# Patient Record
Sex: MALE | Race: WHITE | NOT HISPANIC OR LATINO | Employment: UNEMPLOYED | URBAN - NONMETROPOLITAN AREA
[De-identification: names, ages, dates, MRNs, and addresses within clinical notes are randomized per-mention and may not be internally consistent; named-entity substitution may affect disease eponyms.]

---

## 2018-07-26 ENCOUNTER — HOSPITAL ENCOUNTER (OUTPATIENT)
Dept: GENERAL RADIOLOGY | Facility: OTHER | Age: 28
Discharge: HOME | End: 2018-07-26
Attending: FAMILY MEDICINE | Admitting: FAMILY MEDICINE
Payer: MEDICAID

## 2018-07-26 ENCOUNTER — OFFICE VISIT (OUTPATIENT)
Dept: FAMILY MEDICINE | Facility: OTHER | Age: 28
End: 2018-07-26
Attending: FAMILY MEDICINE
Payer: MEDICAID

## 2018-07-26 VITALS
TEMPERATURE: 98.2 F | WEIGHT: 315 LBS | BODY MASS INDEX: 46.65 KG/M2 | DIASTOLIC BLOOD PRESSURE: 80 MMHG | SYSTOLIC BLOOD PRESSURE: 132 MMHG | HEART RATE: 64 BPM | HEIGHT: 69 IN

## 2018-07-26 DIAGNOSIS — M22.2X2 PATELLOFEMORAL PAIN SYNDROME OF BOTH KNEES: ICD-10-CM

## 2018-07-26 DIAGNOSIS — Z23 NEED FOR DIPHTHERIA-TETANUS-PERTUSSIS (TDAP) VACCINE, ADULT/ADOLESCENT: ICD-10-CM

## 2018-07-26 DIAGNOSIS — Z13.220 SCREENING FOR HYPERLIPIDEMIA: ICD-10-CM

## 2018-07-26 DIAGNOSIS — Z13.228 SCREENING FOR METABOLIC DISORDER: ICD-10-CM

## 2018-07-26 DIAGNOSIS — M25.561 CHRONIC PAIN OF BOTH KNEES: ICD-10-CM

## 2018-07-26 DIAGNOSIS — G89.29 CHRONIC PAIN OF BOTH KNEES: ICD-10-CM

## 2018-07-26 DIAGNOSIS — Z72.0 TOBACCO ABUSE: ICD-10-CM

## 2018-07-26 DIAGNOSIS — M22.2X1 PATELLOFEMORAL PAIN SYNDROME OF BOTH KNEES: ICD-10-CM

## 2018-07-26 DIAGNOSIS — Z13.0 SCREENING FOR DEFICIENCY ANEMIA: ICD-10-CM

## 2018-07-26 DIAGNOSIS — Z00.00 VISIT FOR PREVENTIVE HEALTH EXAMINATION: Primary | ICD-10-CM

## 2018-07-26 DIAGNOSIS — M25.562 CHRONIC PAIN OF BOTH KNEES: ICD-10-CM

## 2018-07-26 DIAGNOSIS — G44.209 TENSION HEADACHE: ICD-10-CM

## 2018-07-26 PROCEDURE — G0463 HOSPITAL OUTPT CLINIC VISIT: HCPCS | Mod: 25

## 2018-07-26 PROCEDURE — 73564 X-RAY EXAM KNEE 4 OR MORE: CPT

## 2018-07-26 PROCEDURE — 90715 TDAP VACCINE 7 YRS/> IM: CPT | Performed by: FAMILY MEDICINE

## 2018-07-26 PROCEDURE — 99395 PREV VISIT EST AGE 18-39: CPT | Performed by: FAMILY MEDICINE

## 2018-07-26 PROCEDURE — 99214 OFFICE O/P EST MOD 30 MIN: CPT | Mod: 25 | Performed by: FAMILY MEDICINE

## 2018-07-26 PROCEDURE — G0463 HOSPITAL OUTPT CLINIC VISIT: HCPCS

## 2018-07-26 PROCEDURE — 90471 IMMUNIZATION ADMIN: CPT

## 2018-07-26 RX ORDER — BUTALBITAL, ACETAMINOPHEN AND CAFFEINE 50; 325; 40 MG/1; MG/1; MG/1
1-2 TABLET ORAL EVERY 4 HOURS PRN
Qty: 30 TABLET | Refills: 1 | Status: SHIPPED | OUTPATIENT
Start: 2018-07-26 | End: 2019-05-13

## 2018-07-26 ASSESSMENT — PAIN SCALES - GENERAL: PAINLEVEL: NO PAIN (0)

## 2018-07-26 NOTE — MR AVS SNAPSHOT
After Visit Summary   7/26/2018    Suraj Currie    MRN: [id number]           Patient Information     Date Of Birth          1990        Visit Information        Provider Department      7/26/2018 3:15 PM Michael Marquis MD [facility]        Today's Diagnoses     Visit for preventive health examination    -  1    Screening for deficiency anemia        Screening for metabolic disorder        Screening for hyperlipidemia        Tobacco abuse        Chronic pain of both knees        Patellofemoral pain syndrome of both knees        Tension headache        Need for diphtheria-tetanus-pertussis (Tdap) vaccine, adult/adolescent          Care Instructions    PT will call you.  Good shoes with a firm last (sole), arch support and good heel counter support.  Hamstring stretches, bike riding.     For headache, keep track of when they happen, possible triggers.              Follow-ups after your visit        Follow-up notes from your care team     Return in about 4 weeks (around 8/23/2018).      Future tests that were ordered for you today     Open Future Orders        Priority Expected Expires Ordered    XR Knee Standing 2v Bilateral & 2v Bilateral Routine 7/26/2018 7/26/2019 7/26/2018    CBC with platelets differential Routine  7/27/2019 7/26/2018    Comprehensive metabolic panel Routine  7/27/2019 7/26/2018    Lipid Profile Routine  7/27/2019 7/26/2018            Who to contact     If you have questions or need follow up information about today's clinic visit or your schedule please contact [facility] directly at [phone].  Normal or non-critical lab and imaging results will be communicated to you by MyChart, letter or phone within 4 business days after the clinic has received the results. If you do not hear from us within 7 days, please contact the clinic through MyChart or phone. If you have a critical or abnormal lab result, we will notify you by  "phone as soon as possible.  Submit refill requests through [employer] or call your pharmacy and they will forward the refill request to us. Please allow 3 business days for your refill to be completed.          Additional Information About Your Visit        Care EveryWhere ID     This is your Care EveryWhere ID. This could be used by other organizations to access your [place] medical records  [id number]        Your Vitals Were     Pulse Temperature Height BMI (Body Mass Index)          64 98.2  F (36.8  C) (Tympanic) 5' 9\" (1.753 m) 47.02 kg/m2         Blood Pressure from Last 3 Encounters:   07/26/18 132/80   08/12/14 114/74    Weight from Last 3 Encounters:   07/26/18 318 lb 6.4 oz (144.4 kg)   08/12/14 299 lb (135.6 kg)              We Performed the Following     TDAP VACCINE (BOOSTRIX)          Today's Medication Changes          These changes are accurate as of 7/26/18  4:30 PM.  If you have any questions, ask your nurse or doctor.               Start taking these medicines.        Dose/Directions    butalbital-acetaminophen-caffeine -40 MG per tablet   Commonly known as:  FIORICET/ESGIC   Used for:  Tension headache   Started by:  Michael Marquis MD        Dose:  1-2 tablet   Take 1-2 tablets by mouth every 4 hours as needed   Quantity:  30 tablet   Refills:  1            Where to get your medicines      Some of these will need a paper prescription and others can be bought over the counter.  Ask your nurse if you have questions.     Bring a paper prescription for each of these medications     butalbital-acetaminophen-caffeine -40 MG per tablet                Primary Care Provider Fax #    Physician No Ref-Primary [phone]       No address on file        Equal Access to Services     [facility] : Rita Farias, ligia salmon, tariq bowser. So [facility] [phone].    ATENCIÓN: Si habla español, tiene a davis disposición " servicios gratuitos de asistencia lingüística. [place] thakur [phone].    We comply with applicable federal civil rights laws and [place] laws. We do not discriminate on the basis of race, color, national origin, age, disability, sex, sexual orientation, or gender identity.            Thank you!     Thank you for choosing [facility]  for your care. Our goal is always to provide you with excellent care. Hearing back from our patients is one way we can continue to improve our services. Please take a few minutes to complete the written survey that you may receive in the mail after your visit with us. Thank you!             Your Updated Medication List - Protect others around you: Learn how to safely use, store and throw away your medicines at www.disposemymeds.org.          This list is accurate as of 7/26/18  4:30 PM.  Always use your most recent med list.                   Brand Name Dispense Instructions for use Diagnosis    butalbital-acetaminophen-caffeine -40 MG per tablet    FIORICET/ESGIC    30 tablet    Take 1-2 tablets by mouth every 4 hours as needed    Tension headache

## 2018-07-26 NOTE — PATIENT INSTRUCTIONS
PT will call you.  Good shoes with a firm last (sole), arch support and good heel counter support.  Hamstring stretches, bike riding.     For headache, keep track of when they happen, possible triggers.

## 2018-07-26 NOTE — NURSING NOTE
Patient presents in the clinic to establish care, and to discuss possible elevated blood pressure. Patient also has concerns of pain in bilateral knees, denies any injury.  Eusebia Smith LPN 7/26/2018 3:14 PM

## 2018-07-26 NOTE — PROGRESS NOTES
Nursing Notes:   Janine Smith LPN  2018  3:50 PM  Signed  Patient presents in the clinic to establish care, and to discuss possible elevated blood pressure. Patient also has concerns of pain in bilateral knees, denies any injury.  Eusebia Luis [place] 2018 3:14 PM      SUBJECTIVE:  Suraj Currie  is a 27 year old male who comes in today to establish care.  He had some concerns about high blood pressure. He has not checked it. His mom has high blood pressure. He has been getting headaches.  He will have them every other day. Ibuprofen didn't seem to help. Sleep will help some. No aura.  No recent head injury. He thinks there may be some headache history in the family.     He has had pain in both knees with no known injury.  They have bothered him for some time. He was a wrestler in high school. His joints are noisy. He has a hard time crouching down. They will swell and will feel tight.  Right knee bothers at night, left bothers during the day. He has tried icing and it didn't help. No knee surgeries.     PHQ-9 SCORE 2018   Total Score 2     Immunization status is uncertain as there is nothing in [facility].  His last tetanus shot over 10 years ago.     He had a sister who  in her early 20's possibly of an overdose. He has another sister he has never met.     Past Medical, Family, and Social History reviewed and updated as noted below.   ROS is negative except as noted above       No Known Allergies,   Family History   Problem Relation Age of Onset     Hypertension Mother      Hypertension Sister      Diabetes Sister      Lung Cancer Maternal Grandmother      Hypertension Maternal Grandfather      Heart Failure Maternal Grandfather      Diabetes Maternal Grandfather      Other - See Comments Sister      possible OD.   ,   Current Outpatient Prescriptions   Medication     butalbital-acetaminophen-caffeine (FIORICET/ESGIC) -40 MG per tablet     No current facility-administered medications for  "this visit.    , History reviewed. No pertinent past medical history.,   Patient Active Problem List    Diagnosis Date Noted     Tobacco abuse 07/26/2018     Priority: Medium   ,   Past Surgical History:   Procedure Laterality Date     TONSILLECTOMY & ADENOIDECTOMY  age 11    and   Social History   Substance Use Topics     Smoking status: Current Every Day Smoker     Packs/day: 0.50     Types: Cigarettes     Smokeless tobacco: Never Used     Alcohol use No      Comment: Alcoholic Drinks/day: occasional     OBJECTIVE:  /80 (BP Location: Right arm, Patient Position: Sitting, Cuff Size: Adult Large)  Pulse 64  Temp 98.2  F (36.8  C) (Tympanic)  Ht 5' 9\" (1.753 m)  Wt 318 lb 6.4 oz (144.4 kg)  BMI 47.02 kg/m2   EXAM:  General Appearance: Pleasant, alert, obese white male appropriate appearance for age. No acute distress  Head Exam: Normal. Normocephalic, atraumatic.  Eye Exam:  Normal external eye, conjunctiva, lids, cornea. ERICA. EOMI  Ear Exam: Normal TM's bilaterally. Normal auditory canals and external ears. Non-tender.  Nose Exam: Normal external nose, mucus membranes, and septum.  OroPharynx Exam:  Dental hygiene adequate. Normal buccal mucosa. Normal pharynx.  Neck Exam:  Supple, no masses or nodes. No audible bruits.  Neck range of motion is preserved with no pain.  No tenderness over the occipital nerve region.  Thyroid Exam: No nodules or enlargement.  Chest/Respiratory Exam: Normal chest wall and respirations. Clear to auscultation.  Cardiovascular Exam: Regular rate and rhythm. S1, S2, no murmur, click, gallop, or rubs.  Gastrointestinal Exam: Soft, non-tender, no masses or organomegaly.  Lymphatic Exam: Non-palpable nodes in neck, clavicular regions.  Musculoskeletal Exam: Back is straight and non-tender, full ROM of upper and lower extremities.  Increased Q angle with tight hamstrings bilaterally.  Significant pes planus is noted.  Patellar tracking is far lateral with crepitus on flexion and " extension.  Cary's is equivocal left and negative right.  Joints are stable.  Foot Exam: Left and right foot: good pedal pulses.  Skin: no rash or abnormalities  Neurologic Exam: Nonfocal, normal gross motor, tone coordination and no tremor.  Psychiatric Exam: Alert and oriented - appropriate affect.     Labs were ordered but not drawn.    Knee x-rays are pending radiology review.  Significant lateral displacement of his patellae.  Degenerative changes are noted left greater than right.  ASSESSMENT/Plan :    Suraj was seen today for establish care.    Diagnoses and all orders for this visit:    Visit for preventive health examination    Screening for deficiency anemia  -     CBC with platelets differential; Future    Screening for metabolic disorder  -     Comprehensive metabolic panel; Future    Screening for hyperlipidemia  -     Lipid Profile; Future    Tobacco abuse    Chronic pain of both knees  -     PHYSICAL THERAPY REFERRAL    Patellofemoral pain syndrome of both knees  -     XR Knee Standing 2v Bilateral & 2v Bilateral; Future  -     PHYSICAL THERAPY REFERRAL    Tension headache  -     butalbital-acetaminophen-caffeine (FIORICET/ESGIC) -40 MG per tablet; Take 1-2 tablets by mouth every 4 hours as needed    Need for diphtheria-tetanus-pertussis (Tdap) vaccine, adult/adolescent  -     TDAP VACCINE (BOOSTRIX)      Boostrix today.  Appears that he did not go to lab. Will notify of lab results when available.     Lengthy discussion with regard to his knees.  Feel he has significant malalignment and patellofemoral syndrome.  Discussed importance of good foot wear with a firm last, good arch support in good heel counter.  Needs to work on hamstring stretching and quadricep strengthening.  Referred to physical therapy for same.  If he does not have improvement in his symptoms, would then consider referral to orthopedics as he may be a candidate for lateral release or other corrective surgery.    Lengthy  discussion with regard to his weight and his risk for diabetes and hypertension given his family history and certainly this is causing significant difficulties with his already abnormal knees.  Discussed the diabetes prevention trial and the importance of a healthy diet and increased exercise.  If he is unsuccessful in doing this on his own, may need to consider referral for medical weight management.    For his headaches, it is unlikely that this is occipital neuralgia but may just be a tension headache.  Certainly could be a migraine without aura.  We discussed keeping a headache calendar/log and tracking when his headaches occur and possible triggers etc.  In the interim will use Fioricet as a abortive treatment.    Greater than 3 minutes was spent in consultation and education regarding tobacco cessation due to associated long-term complications of continued tobacco use.     A total of 25 minutes was spent with the patient, greater than 50% of the time was spent in counseling/discussion of the aforementioned concerns in addition to the preventive health visit.    Michael Marquis MD

## 2018-07-27 ENCOUNTER — TELEPHONE (OUTPATIENT)
Dept: FAMILY MEDICINE | Facility: OTHER | Age: 28
End: 2018-07-27

## 2018-07-27 ASSESSMENT — PATIENT HEALTH QUESTIONNAIRE - PHQ9: SUM OF ALL RESPONSES TO PHQ QUESTIONS 1-9: 2

## 2018-07-27 NOTE — TELEPHONE ENCOUNTER
Patient returning a call back probably to schedule PT- was told to call back PT department.     Is also wondering results of knee xray done yesterday.  [facility] is out until Tuesday.    Cindi Mejia LPN ...... 7/27/2018 2:04 PM

## 2018-07-27 NOTE — TELEPHONE ENCOUNTER
J already ordered Physical Therapy for him.  Please assist him in connecting with them to schedule appointment.  X-ray showed mild arthritis and a possible loose piece of bone or cartilage.  Yola Rae MD on 7/27/2018 at 3:01 PM

## 2018-09-06 ENCOUNTER — OFFICE VISIT (OUTPATIENT)
Dept: FAMILY MEDICINE | Facility: OTHER | Age: 28
End: 2018-09-06
Attending: FAMILY MEDICINE
Payer: COMMERCIAL

## 2018-09-06 VITALS
HEART RATE: 60 BPM | WEIGHT: 315 LBS | BODY MASS INDEX: 47.7 KG/M2 | DIASTOLIC BLOOD PRESSURE: 88 MMHG | SYSTOLIC BLOOD PRESSURE: 126 MMHG

## 2018-09-06 DIAGNOSIS — Z72.0 TOBACCO ABUSE: ICD-10-CM

## 2018-09-06 DIAGNOSIS — M22.2X2 PATELLOFEMORAL PAIN SYNDROME OF BOTH KNEES: ICD-10-CM

## 2018-09-06 DIAGNOSIS — M22.2X1 PATELLOFEMORAL PAIN SYNDROME OF BOTH KNEES: ICD-10-CM

## 2018-09-06 DIAGNOSIS — M25.561 CHRONIC PAIN OF BOTH KNEES: Primary | ICD-10-CM

## 2018-09-06 DIAGNOSIS — G89.29 CHRONIC PAIN OF BOTH KNEES: Primary | ICD-10-CM

## 2018-09-06 DIAGNOSIS — M25.562 CHRONIC PAIN OF BOTH KNEES: Primary | ICD-10-CM

## 2018-09-06 PROCEDURE — 99213 OFFICE O/P EST LOW 20 MIN: CPT | Performed by: FAMILY MEDICINE

## 2018-09-06 PROCEDURE — G0463 HOSPITAL OUTPT CLINIC VISIT: HCPCS

## 2018-09-06 ASSESSMENT — PAIN SCALES - GENERAL: PAINLEVEL: SEVERE PAIN (7)

## 2018-09-06 NOTE — PROGRESS NOTES
"Nursing Notes:   Rossy Weeks LPN  9/6/2018  2:47 PM  Signed  Chief Complaint   Patient presents with     RECHECK     knee pain       Initial /88 (BP Location: Right arm, Patient Position: Chair, Cuff Size: Adult Large)  Pulse 60  Wt 323 lb (146.5 kg)  BMI 47.7 kg/m2 Estimated body mass index is 47.7 kg/(m^2) as calculated from the following:    Height as of 7/26/18: 5' 9\" (1.753 m).    Weight as of this encounter: 323 lb (146.5 kg).  Medication Reconciliation: complete    Rossy Weeks LPN    SUBJECTIVE:  Suraj Currie  is a 27 year old male who comes in today for follow-up.  I saw him in July to establish care.  He was having trouble with his knees and he felt that he probably had significant malalignment and patellofemoral syndrome.  Discussed hamstring stretching quadricep strengthening and referral to physical therapy.  The plan was if he did not improve we would send him to orthopedics for consultation.  I also discussed having him work on weight loss and becoming more fit and healthy.    He had to reschedule a couple of times for PT and they didn't call back. He is having mechanical locking symptoms.     He is still smoking    Past Medical, Family, and Social History reviewed and updated as noted below.   ROS is negative except as noted above       No Known Allergies,   Family History   Problem Relation Age of Onset     Hypertension Mother      Hypertension Sister      Diabetes Sister      Lung Cancer Maternal Grandmother      Hypertension Maternal Grandfather      Heart Failure Maternal Grandfather      Diabetes Maternal Grandfather      Other - See Comments Sister      possible OD.   ,   Current Outpatient Prescriptions   Medication     butalbital-acetaminophen-caffeine (FIORICET/ESGIC) -40 MG per tablet     No current facility-administered medications for this visit.    , History reviewed. No pertinent past medical history.,   Patient Active Problem List    Diagnosis Date Noted "     Tobacco abuse 07/26/2018     Priority: Medium     Patellofemoral pain syndrome of both knees 07/26/2018     Priority: Medium     Tension headache 07/26/2018     Priority: Medium     Class 3 obesity with serious comorbidity and body mass index (BMI) of 45.0 to 49.9 in adult, unspecified obesity type (H) 07/26/2018     Priority: Medium     Chronic pain of both knees 07/26/2018     Priority: Medium   ,   Past Surgical History:   Procedure Laterality Date     TONSILLECTOMY & ADENOIDECTOMY  age 11    and   Social History   Substance Use Topics     Smoking status: Current Every Day Smoker     Packs/day: 0.50     Types: Cigarettes     Smokeless tobacco: Never Used     Alcohol use No      Comment: Alcoholic Drinks/day: occasional     OBJECTIVE:  /88 (BP Location: Right arm, Patient Position: Chair, Cuff Size: Adult Large)  Pulse 60  Wt 323 lb (146.5 kg)  BMI 47.7 kg/m2   EXAM:  Obese white male, no distress.  Increased Q angle, overpronation, lateral displacement of his patella and tight hamstrings and weak quadriceps are again seen on exam.  He is still smoking  ASSESSMENT/Plan :    Suraj was seen today for recheck.    Diagnoses and all orders for this visit:    Chronic pain of both knees  -     PHYSICAL THERAPY REFERRAL  -     ORTHOPEDICS ADULT REFERRAL    Patellofemoral pain syndrome of both knees  -     PHYSICAL THERAPY REFERRAL  -     ORTHOPEDICS ADULT REFERRAL    Tobacco abuse  -     SMOKING CESSATION COUNSELING 3-10 MIN    Class 3 obesity with serious comorbidity and body mass index (BMI) of 45.0 to 49.9 in adult, unspecified obesity type (H)      Referred back to physical therapy.  Discussed icing stretching quadricep strengthening and appropriate footwear.  Referred to orthopedics as he may be a candidate for lateral release.    Discussed importance of weight loss.    Greater than 3 minutes was spent in consultation and educationregarding tobacco cessation due to associated long-term complications of  continued tobacco use.     Michael Marquis MD

## 2018-09-06 NOTE — MR AVS SNAPSHOT
After Visit Summary   9/6/2018    Suraj Currie    MRN: [id number]           Patient Information     Date Of Birth          1990        Visit Information        Provider Department      9/6/2018 2:00 PM Michael Marquis MD [facility]        Today's Diagnoses     Chronic pain of both knees    -  1    Patellofemoral pain syndrome of both knees        Tobacco abuse        Class 3 obesity with serious comorbidity and body mass index (BMI) of 45.0 to 49.9 in adult, unspecified obesity type (H)           Follow-ups after your visit        Additional Services     ORTHOPEDICS ADULT REFERRAL       Your provider has referred you to:Dr. Carnes or Oliverio    Please be aware that coverage of these services is subject to the terms and limitations of your health insurance plan.  Call member services at your health plan with any benefit or coverage questions.      Please bring the following to your appointment:    >>   Any x-rays, CTs or MRIs which have been performed.  Contact the facility where they were done to arrange for  prior to your scheduled appointment.    >>   List of current medications   >>   This referral request   >>   Any documents/labs given to you for this referral            PHYSICAL THERAPY REFERRAL                 Your next 10 appointments already scheduled     Sep 11, 2018 10:15 AM CDT   [facility]    [address] [address]   [phone]            Sep 18, 2018  2:00 PM CDT   Treatment with Prashant [facility], PT   [facility]    [address] [address]   [phone]            Sep 25, 2018  1:45 PM CDT   Treatment with Prashant [facility]atalo, PT   [facility]    [address] [address]    [phone]              Who to contact     If you have questions or need follow up information about today's clinic visit or your schedule please contact [facility] directly at [phone].  Normal or non-critical lab and imaging results will be communicated to you by MyChart, letter or phone within 4 business days after the clinic has received the results. If you do not hear from us within 7 days, please contact the clinic through MyChart or phone. If you have a critical or abnormal lab result, we will notify you by phone as soon as possible.  Submit refill requests through [employer] or call your pharmacy and they will forward the refill request to us. Please allow 3 business days for your refill to be completed.          Additional Information About Your Visit        Care EveryWhere ID     This is your Care EveryWhere ID. This could be used by other organizations to access your [place] medical records  [id number]        Your Vitals Were     Pulse BMI (Body Mass Index)                60 47.7 kg/m2           Blood Pressure from Last 3 Encounters:   09/06/18 126/88   07/26/18 132/80   08/12/14 114/74    Weight from Last 3 Encounters:   09/06/18 323 lb (146.5 kg)   07/26/18 318 lb 6.4 oz (144.4 kg)   08/12/14 299 lb (135.6 kg)              We Performed the Following     ORTHOPEDICS ADULT REFERRAL     PHYSICAL THERAPY REFERRAL     SMOKING CESSATION COUNSELING 3-10 MIN        Primary Care Provider Fax #    Physician No Ref-Primary [phone]       No address on file        Equal Access to Services     SHAYNA MACK : Hadii lawrence Farias, waaxda ludave, qaybta kaalmada theodore, tariq rios. So [facility] [phone].    ATENCIÓN: Si habla español, tiene a davis disposición servicios gratuitos de asistencia lingüística. Llame al [phone].    We comply with applicable federal civil rights laws and [place] laws. We do not discriminate on the basis of  race, color, national origin, age, disability, sex, sexual orientation, or gender identity.            Thank you!     Thank you for choosing [facility]  for your care. Our goal is always to provide you with excellent care. Hearing back from our patients is one way we can continue to improve our services. Please take a few minutes to complete the written survey that you may receive in the mail after your visit with us. Thank you!             Your Updated Medication List - Protect others around you: Learn how to safely use, store and throw away your medicines at www.disposemymeds.org.          This list is accurate as of 9/6/18  6:07 PM.  Always use your most recent med list.                   Brand Name Dispense Instructions for use Diagnosis    butalbital-acetaminophen-caffeine -40 MG per tablet    FIORICET/ESGIC    30 tablet    Take 1-2 tablets by mouth every 4 hours as needed    Tension headache

## 2018-09-06 NOTE — NURSING NOTE
"Chief Complaint   Patient presents with     RECHECK     knee pain       Initial /88 (BP Location: Right arm, Patient Position: Chair, Cuff Size: Adult Large)  Pulse 60  Wt 323 lb (146.5 kg)  BMI 47.7 kg/m2 Estimated body mass index is 47.7 kg/(m^2) as calculated from the following:    Height as of 7/26/18: 5' 9\" (1.753 m).    Weight as of this encounter: 323 lb (146.5 kg).  Medication Reconciliation: complete    Rossy Weeks LPN  "

## 2018-09-27 ENCOUNTER — TELEPHONE (OUTPATIENT)
Dept: FAMILY MEDICINE | Facility: OTHER | Age: 28
End: 2018-09-27

## 2018-09-27 DIAGNOSIS — K04.7 DENTAL ABSCESS: Primary | ICD-10-CM

## 2018-09-27 RX ORDER — PENICILLIN V POTASSIUM 500 MG/1
500 TABLET, FILM COATED ORAL 3 TIMES DAILY
Qty: 30 TABLET | Refills: 0 | Status: SHIPPED | OUTPATIENT
Start: 2018-09-27 | End: 2019-05-13

## 2018-09-27 NOTE — TELEPHONE ENCOUNTER
Patient states his Dentist diagnosed him as having a pus pocket to top of his mouth.  He does not remember Dentist name but office is in [place].  Patient was told to take Tylenol after visit, until mouth heals then he can receive dental care.  He utilizes [facility] pharmacy.  Please advise.    Jenn Boyle LPN............9/27/2018 2:06 PM

## 2018-09-27 NOTE — TELEPHONE ENCOUNTER
Patient notified   He has a couple more question.    He was referred to Ortho but he missed his appt. Looks like he is scheduled to see Dr Carnes on 10/30/18 @1:15p Patient notified   Abby Mao LPN ..........9/27/2018 2:39 PM

## 2018-09-28 ENCOUNTER — TELEPHONE (OUTPATIENT)
Dept: FAMILY MEDICINE | Facility: OTHER | Age: 28
End: 2018-09-28

## 2018-09-28 NOTE — TELEPHONE ENCOUNTER
Patient states is returning Dr. Carnes's nurse call. Can you call him?   Margo Alejandre LPN...................9/28/2018   10:31 AM

## 2019-05-13 ENCOUNTER — OFFICE VISIT (OUTPATIENT)
Dept: FAMILY MEDICINE | Facility: OTHER | Age: 29
End: 2019-05-13
Attending: FAMILY MEDICINE
Payer: MEDICAID

## 2019-05-13 VITALS
DIASTOLIC BLOOD PRESSURE: 88 MMHG | WEIGHT: 315 LBS | HEART RATE: 76 BPM | BODY MASS INDEX: 46.65 KG/M2 | SYSTOLIC BLOOD PRESSURE: 124 MMHG | TEMPERATURE: 96.9 F | HEIGHT: 69 IN | RESPIRATION RATE: 20 BRPM

## 2019-05-13 DIAGNOSIS — G89.29 CHRONIC PAIN OF BOTH KNEES: ICD-10-CM

## 2019-05-13 DIAGNOSIS — Z72.0 TOBACCO ABUSE: ICD-10-CM

## 2019-05-13 DIAGNOSIS — Z13.0 SCREENING FOR DEFICIENCY ANEMIA: ICD-10-CM

## 2019-05-13 DIAGNOSIS — M62.838 MUSCLE SPASM: ICD-10-CM

## 2019-05-13 DIAGNOSIS — M22.2X2 PATELLOFEMORAL PAIN SYNDROME OF BOTH KNEES: Primary | ICD-10-CM

## 2019-05-13 DIAGNOSIS — Z13.220 SCREENING FOR HYPERLIPIDEMIA: ICD-10-CM

## 2019-05-13 DIAGNOSIS — M25.562 CHRONIC PAIN OF BOTH KNEES: ICD-10-CM

## 2019-05-13 DIAGNOSIS — E66.01 CLASS 3 SEVERE OBESITY DUE TO EXCESS CALORIES WITH SERIOUS COMORBIDITY AND BODY MASS INDEX (BMI) OF 45.0 TO 49.9 IN ADULT (H): ICD-10-CM

## 2019-05-13 DIAGNOSIS — E66.813 CLASS 3 SEVERE OBESITY DUE TO EXCESS CALORIES WITH SERIOUS COMORBIDITY AND BODY MASS INDEX (BMI) OF 45.0 TO 49.9 IN ADULT (H): ICD-10-CM

## 2019-05-13 DIAGNOSIS — Z13.228 SCREENING FOR METABOLIC DISORDER: ICD-10-CM

## 2019-05-13 DIAGNOSIS — M22.2X1 PATELLOFEMORAL PAIN SYNDROME OF BOTH KNEES: Primary | ICD-10-CM

## 2019-05-13 DIAGNOSIS — M25.561 CHRONIC PAIN OF BOTH KNEES: ICD-10-CM

## 2019-05-13 LAB
ALBUMIN SERPL-MCNC: 4.6 G/DL (ref 3.5–5.7)
ALP SERPL-CCNC: 84 U/L (ref 34–104)
ALT SERPL W P-5'-P-CCNC: 21 U/L (ref 7–52)
ANION GAP SERPL CALCULATED.3IONS-SCNC: 8 MMOL/L (ref 3–14)
AST SERPL W P-5'-P-CCNC: 22 U/L (ref 13–39)
BASOPHILS # BLD AUTO: 0.1 10E9/L (ref 0–0.2)
BASOPHILS NFR BLD AUTO: 0.8 %
BILIRUB SERPL-MCNC: 0.5 MG/DL (ref 0.3–1)
BUN SERPL-MCNC: 9 MG/DL (ref 7–25)
CALCIUM SERPL-MCNC: 9.9 MG/DL (ref 8.6–10.3)
CHLORIDE SERPL-SCNC: 105 MMOL/L (ref 98–107)
CHOLEST SERPL-MCNC: 190 MG/DL
CO2 SERPL-SCNC: 23 MMOL/L (ref 21–31)
CREAT SERPL-MCNC: 0.93 MG/DL (ref 0.7–1.3)
DIFFERENTIAL METHOD BLD: ABNORMAL
EOSINOPHIL # BLD AUTO: 0.3 10E9/L (ref 0–0.7)
EOSINOPHIL NFR BLD AUTO: 2 %
ERYTHROCYTE [DISTWIDTH] IN BLOOD BY AUTOMATED COUNT: 12.4 % (ref 10–15)
GFR SERPL CREATININE-BSD FRML MDRD: >90 ML/MIN/{1.73_M2}
GLUCOSE SERPL-MCNC: 104 MG/DL (ref 70–105)
HCT VFR BLD AUTO: 53 % (ref 40–53)
HDLC SERPL-MCNC: 32 MG/DL (ref 23–92)
HGB BLD-MCNC: 18.8 G/DL (ref 13.3–17.7)
IMM GRANULOCYTES # BLD: 0.1 10E9/L (ref 0–0.4)
IMM GRANULOCYTES NFR BLD: 0.5 %
LDLC SERPL CALC-MCNC: 117 MG/DL
LYMPHOCYTES # BLD AUTO: 4.3 10E9/L (ref 0.8–5.3)
LYMPHOCYTES NFR BLD AUTO: 30.6 %
MCH RBC QN AUTO: 31.5 PG (ref 26.5–33)
MCHC RBC AUTO-ENTMCNC: 35.5 G/DL (ref 31.5–36.5)
MCV RBC AUTO: 89 FL (ref 78–100)
MONOCYTES # BLD AUTO: 0.6 10E9/L (ref 0–1.3)
MONOCYTES NFR BLD AUTO: 3.9 %
NEUTROPHILS # BLD AUTO: 8.7 10E9/L (ref 1.6–8.3)
NEUTROPHILS NFR BLD AUTO: 62.2 %
NONHDLC SERPL-MCNC: 158 MG/DL
PLATELET # BLD AUTO: 349 10E9/L (ref 150–450)
POTASSIUM SERPL-SCNC: 3.9 MMOL/L (ref 3.5–5.1)
PROT SERPL-MCNC: 8 G/DL (ref 6.4–8.9)
RBC # BLD AUTO: 5.97 10E12/L (ref 4.4–5.9)
SODIUM SERPL-SCNC: 136 MMOL/L (ref 134–144)
TRIGL SERPL-MCNC: 205 MG/DL
WBC # BLD AUTO: 13.9 10E9/L (ref 4–11)

## 2019-05-13 PROCEDURE — 36415 COLL VENOUS BLD VENIPUNCTURE: CPT | Mod: ZL | Performed by: FAMILY MEDICINE

## 2019-05-13 PROCEDURE — 85025 COMPLETE CBC W/AUTO DIFF WBC: CPT | Mod: ZL | Performed by: FAMILY MEDICINE

## 2019-05-13 PROCEDURE — G0463 HOSPITAL OUTPT CLINIC VISIT: HCPCS | Mod: 25

## 2019-05-13 PROCEDURE — 80053 COMPREHEN METABOLIC PANEL: CPT | Mod: ZL | Performed by: FAMILY MEDICINE

## 2019-05-13 PROCEDURE — 80061 LIPID PANEL: CPT | Mod: ZL | Performed by: FAMILY MEDICINE

## 2019-05-13 PROCEDURE — 99213 OFFICE O/P EST LOW 20 MIN: CPT | Mod: 25 | Performed by: FAMILY MEDICINE

## 2019-05-13 PROCEDURE — 99406 BEHAV CHNG SMOKING 3-10 MIN: CPT | Performed by: FAMILY MEDICINE

## 2019-05-13 PROCEDURE — G0463 HOSPITAL OUTPT CLINIC VISIT: HCPCS

## 2019-05-13 ASSESSMENT — MIFFLIN-ST. JEOR: SCORE: 2451.79

## 2019-05-13 ASSESSMENT — PAIN SCALES - GENERAL: PAINLEVEL: SEVERE PAIN (7)

## 2019-05-13 NOTE — LETTER
May 13, 2019      Suraj Currie  [address] [place] [id number]        Dear Suraj,     Your labs look ok. Your complete blood count was normal. Your comprehensive metabolic panel (a test that looks at liver and kidney function, blood sugar, electrolytes, and nutritional status) was normal. Your cholesterol was ok as well. You have no evidence of diabetes at this time.     It was a pleasure seeing you the other day.  If you have any questions, please don't hesitate to call us.       Sincerely,        Michael Marquis MD                                      Results for orders placed or performed in visit on 05/13/19   Lipid Profile   Result Value Ref Range    Cholesterol 190 <200 mg/dL    Triglycerides 205 (H) <150 mg/dL    HDL Cholesterol 32 23 - 92 mg/dL    LDL Cholesterol Calculated 117 (H) <100 mg/dL    Non HDL Cholesterol 158 (H) <130 mg/dL   Comprehensive metabolic panel   Result Value Ref Range    Sodium 136 134 - 144 mmol/L    Potassium 3.9 3.5 - 5.1 mmol/L    Chloride 105 98 - 107 mmol/L    Carbon Dioxide 23 21 - 31 mmol/L    Anion Gap 8 3 - 14 mmol/L    Glucose 104 70 - 105 mg/dL    Urea Nitrogen 9 7 - 25 mg/dL    Creatinine 0.93 0.70 - 1.30 mg/dL    GFR Estimate >90 >60 mL/min/[1.73_m2]    GFR Estimate If Black >90 >60 mL/min/[1.73_m2]    Calcium 9.9 8.6 - 10.3 mg/dL    Bilirubin Total 0.5 0.3 - 1.0 mg/dL    Albumin 4.6 3.5 - 5.7 g/dL    Protein Total 8.0 6.4 - 8.9 g/dL    Alkaline Phosphatase 84 34 - 104 U/L    ALT 21 7 - 52 U/L    AST 22 13 - 39 U/L   CBC with platelets differential   Result Value Ref Range    WBC 13.9 (H) 4.0 - 11.0 10e9/L    RBC Count 5.97 (H) 4.4 - 5.9 10e12/L    Hemoglobin 18.8 (H) 13.3 - 17.7 g/dL    Hematocrit 53.0 40.0 - 53.0 %    MCV 89 78 - 100 fl    MCH 31.5 26.5 - 33.0 pg    MCHC 35.5 31.5 - 36.5 g/dL    RDW 12.4 10.0 - 15.0 %    Platelet Count 349 150 - 450 10e9/L    Diff Method Automated Method     % Neutrophils 62.2 %    % Lymphocytes 30.6 %    % Monocytes 3.9 %     % Eosinophils 2.0 %    % Basophils 0.8 %    % Immature Granulocytes 0.5 %    Absolute Neutrophil 8.7 (H) 1.6 - 8.3 10e9/L    Absolute Lymphocytes 4.3 0.8 - 5.3 10e9/L    Absolute Monocytes 0.6 0.0 - 1.3 10e9/L    Absolute Eosinophils 0.3 0.0 - 0.7 10e9/L    Absolute Basophils 0.1 0.0 - 0.2 10e9/L    Abs Immature Granulocytes 0.1 0 - 0.4 10e9/L

## 2019-05-13 NOTE — NURSING NOTE
"Chief Complaint   Patient presents with     RECHECK     bilateral knee pain       Initial /88   Pulse 76   Temp 96.9  F (36.1  C) (Temporal)   Resp 20   Ht 1.753 m (5' 9\")   Wt (!) 150.1 kg (331 lb)   BMI 48.88 kg/m   Estimated body mass index is 48.88 kg/m  as calculated from the following:    Height as of this encounter: 1.753 m (5' 9\").    Weight as of this encounter: 150.1 kg (331 lb).  Medication Reconciliation: complete    Rossy Weeks LPN  "

## 2019-05-13 NOTE — PROGRESS NOTES
"Nursing Notes:   Rossy Weeks, LPN  5/13/2019  2:04 PM  Signed  Chief Complaint   Patient presents with     RECHECK     bilateral knee pain       Initial /88   Pulse 76   Temp 96.9  F (36.1  C) (Temporal)   Resp 20   Ht 1.753 m (5' 9\")   Wt (!) 150.1 kg (331 lb)   BMI 48.88 kg/m    Estimated body mass index is 48.88 kg/m  as calculated from the following:    Height as of this encounter: 1.753 m (5' 9\").    Weight as of this encounter: 150.1 kg (331 lb).  Medication Reconciliation: complete    Rossy Weeks LPN      SUBJECTIVE:  Suraj Currie  is a 28 year old male who comes in for bilateral knee pain.  I saw him last summer for a physical.  At that time, he was having pain in both knees with no known injury.  His joints were noisy and had a hard time crouching down.  They would swell and feel tight.  On examination at that visit, he had an increased Q angle with tight hamstrings bilaterally and significant pes planus with the patella tracking very laterally and crepitus on flexion/extension.  We referred him to physical therapy and talked to him about weight loss, and then also talked about an orthopedic referral.  He came back about 6 weeks later.  At that time he was noting some mechanical locking symptoms.  He never did go to physical therapy.  We referred him back and again it does not appear that that happened.  We referred him to orthopedics and he had an appointment on November 6 but did not show.  He has gained nearly 15 pounds since his last visit.    He has had pain at night.  He has been doing stretches and has tried heat and ice. He has a hard time doing day to day things with his kids.  He didn't do therapy because his son had to have open heart surgery last fall. He has a hernia surgery in [place]. He has a little girl now who is 3 months old.  His right knee is worse than the left.  He has pain if he tries to bend the right knee more than 90 degrees.     Past Medical, Family, and " "Social History reviewed and updated as noted below.   ROS is negative except as noted above       No Known Allergies,   Family History   Problem Relation Age of Onset     Hypertension Mother      Hypertension Sister      Diabetes Sister      Lung Cancer Maternal Grandmother      Hypertension Maternal Grandfather      Heart Failure Maternal Grandfather      Diabetes Maternal Grandfather      Other - See Comments Sister         possible OD.   ,   Current Outpatient Medications   Medication     tiZANidine (ZANAFLEX) 4 MG tablet     No current facility-administered medications for this visit.    , History reviewed. No pertinent past medical history.,   Patient Active Problem List    Diagnosis Date Noted     Tobacco abuse 07/26/2018     Priority: Medium     Patellofemoral pain syndrome of both knees 07/26/2018     Priority: Medium     Tension headache 07/26/2018     Priority: Medium     Class 3 severe obesity due to excess calories with serious comorbidity and body mass index (BMI) of 45.0 to 49.9 in adult (H) 07/26/2018     Priority: Medium     Chronic pain of both knees 07/26/2018     Priority: Medium   ,   Past Surgical History:   Procedure Laterality Date     TONSILLECTOMY & ADENOIDECTOMY  age 11    and   Social History     Tobacco Use     Smoking status: Current Every Day Smoker     Packs/day: 0.50     Types: Cigarettes     Smokeless tobacco: Never Used   Substance Use Topics     Alcohol use: No     Comment: Alcoholic Drinks/day: occasional     OBJECTIVE:  /88   Pulse 76   Temp 96.9  F (36.1  C) (Temporal)   Resp 20   Ht 1.753 m (5' 9\")   Wt (!) 150.1 kg (331 lb)   BMI 48.88 kg/m     EXAM:  Obese white male, no distress.  Increased Q angle is noted with lateral displacement of his patellae.  Still tight in the hamstrings.  Wearing poor footwear and over pronating.  Quadricep strength and VMO definition is poor.  ASSESSMENT/Plan :    Suraj was seen today for recheck.    Diagnoses and all orders for this " visit:    Patellofemoral pain syndrome of both knees  -     PHYSICAL THERAPY REFERRAL; Future  -     ORTHOPEDICS ADULT REFERRAL    Chronic pain of both knees  -     PHYSICAL THERAPY REFERRAL; Future  -     ORTHOPEDICS ADULT REFERRAL  -     BARIATRIC ADULT REFERRAL    Class 3 severe obesity due to excess calories with serious comorbidity and body mass index (BMI) of 45.0 to 49.9 in adult (H)  -     BARIATRIC ADULT REFERRAL    Muscle spasm  -     tiZANidine (ZANAFLEX) 4 MG tablet; Take 1 tablet (4 mg) by mouth 3 times daily as needed for muscle spasms    Screening for deficiency anemia  -     CBC with platelets differential; Future  -     CBC with platelets differential    Screening for metabolic disorder  -     Comprehensive metabolic panel; Future  -     Comprehensive metabolic panel    Screening for hyperlipidemia  -     Lipid Profile; Future  -     Lipid Profile    Tobacco abuse  -     SMOKING CESSATION COUNSELING 3-10 MIN      Lengthy discussion with regard to his condition.  Again discussed working on weight and he is referred for medical weight management with Dr. De La Vega.  Referral sent for physical therapy as well as orthopedic referral as I suspect that he may need surgical intervention with this.  Trial of tizanidine to help with muscle spasm.  Continue with heat and ice as previously directed.    Greater than 3 minutes was spent in consultation and education regarding tobacco cessation due to associated long-term complications of continued tobacco use.       Michael Marquis MD

## 2019-10-17 ENCOUNTER — OFFICE VISIT (OUTPATIENT)
Dept: FAMILY MEDICINE | Facility: OTHER | Age: 29
End: 2019-10-17
Attending: FAMILY MEDICINE
Payer: COMMERCIAL

## 2019-10-17 VITALS
RESPIRATION RATE: 20 BRPM | DIASTOLIC BLOOD PRESSURE: 80 MMHG | OXYGEN SATURATION: 95 % | WEIGHT: 315 LBS | HEIGHT: 69 IN | SYSTOLIC BLOOD PRESSURE: 110 MMHG | TEMPERATURE: 98 F | HEART RATE: 77 BPM | BODY MASS INDEX: 46.65 KG/M2

## 2019-10-17 DIAGNOSIS — E66.813 CLASS 3 SEVERE OBESITY DUE TO EXCESS CALORIES WITH SERIOUS COMORBIDITY AND BODY MASS INDEX (BMI) OF 45.0 TO 49.9 IN ADULT (H): Primary | ICD-10-CM

## 2019-10-17 DIAGNOSIS — M17.0 PRIMARY OSTEOARTHRITIS OF BOTH KNEES: ICD-10-CM

## 2019-10-17 DIAGNOSIS — E66.01 CLASS 3 SEVERE OBESITY DUE TO EXCESS CALORIES WITH SERIOUS COMORBIDITY AND BODY MASS INDEX (BMI) OF 45.0 TO 49.9 IN ADULT (H): Primary | ICD-10-CM

## 2019-10-17 PROCEDURE — 99214 OFFICE O/P EST MOD 30 MIN: CPT | Performed by: FAMILY MEDICINE

## 2019-10-17 PROCEDURE — G0463 HOSPITAL OUTPT CLINIC VISIT: HCPCS

## 2019-10-17 RX ORDER — ETODOLAC 500 MG
500 TABLET ORAL 2 TIMES DAILY
Qty: 60 TABLET | Refills: 3 | Status: SHIPPED | OUTPATIENT
Start: 2019-10-17 | End: 2020-09-24

## 2019-10-17 ASSESSMENT — ANXIETY QUESTIONNAIRES
2. NOT BEING ABLE TO STOP OR CONTROL WORRYING: NOT AT ALL
1. FEELING NERVOUS, ANXIOUS, OR ON EDGE: NOT AT ALL
3. WORRYING TOO MUCH ABOUT DIFFERENT THINGS: NOT AT ALL
5. BEING SO RESTLESS THAT IT IS HARD TO SIT STILL: NOT AT ALL
4. TROUBLE RELAXING: NOT AT ALL
GAD7 TOTAL SCORE: 0
7. FEELING AFRAID AS IF SOMETHING AWFUL MIGHT HAPPEN: NOT AT ALL
6. BECOMING EASILY ANNOYED OR IRRITABLE: NOT AT ALL

## 2019-10-17 ASSESSMENT — MIFFLIN-ST. JEOR: SCORE: 2412.78

## 2019-10-17 ASSESSMENT — PATIENT HEALTH QUESTIONNAIRE - PHQ9: SUM OF ALL RESPONSES TO PHQ QUESTIONS 1-9: 0

## 2019-10-17 ASSESSMENT — PAIN SCALES - GENERAL: PAINLEVEL: MODERATE PAIN (4)

## 2019-10-17 NOTE — NURSING NOTE
Patient presents to the clinic for a right knee follow up.  Medication Reconciliation Completed.    Derick Cummings LPN  10/17/2019 10:51 AM

## 2019-10-18 ASSESSMENT — ANXIETY QUESTIONNAIRES: GAD7 TOTAL SCORE: 0

## 2019-10-18 NOTE — PROGRESS NOTES
SUBJECTIVE:   Suraj Currie is a 28 year old male who presents to clinic today for the following health issues: Knee shoulder pain    Patient arrives here for knee and shoulder pain.  He was earlier seen and diagnosed with patellofemoral syndrome.  Referrals were placed for bariatric surgery.  Orthopedic surgery and physical therapy.  He indicates that he is never was told of any of the referrals nor was he contacted.  He continues to have  bilateral knee pain.  But also reports his shoulder is been hurting for the last 3 to 4 months.  He reports his knee is worse than his shoulder.  He states that they buckle.  They give out.  They are better when they are stretched out.  Though hurt for hours after being on him.  Patient has market obesity.  He states that he is attempting to diet without much relief.  His x-rays were done at his last visit showing decreased joint space.  Some degenerative changes especially in the medial compartment.  And valgus deformity.        Patient Active Problem List    Diagnosis Date Noted     Primary osteoarthritis of both knees 10/17/2019     Priority: Medium     Tobacco abuse 07/26/2018     Priority: Medium     Patellofemoral pain syndrome of both knees 07/26/2018     Priority: Medium     Tension headache 07/26/2018     Priority: Medium     Class 3 severe obesity due to excess calories with serious comorbidity and body mass index (BMI) of 45.0 to 49.9 in adult (H) 07/26/2018     Priority: Medium     Chronic pain of both knees 07/26/2018     Priority: Medium     History reviewed. No pertinent past medical history.   Past Surgical History:   Procedure Laterality Date     TONSILLECTOMY & ADENOIDECTOMY  age 11     Current Outpatient Medications   Medication Sig Dispense Refill     etodolac (LODINE) 500 MG tablet Take 1 tablet (500 mg) by mouth 2 times daily 60 tablet 3     tiZANidine (ZANAFLEX) 4 MG tablet Take 1 tablet (4 mg) by mouth 3 times daily as needed for muscle spasms 30  "tablet 3     No Known Allergies    Review of Systems     OBJECTIVE:     /80 (BP Location: Right arm, Patient Position: Sitting, Cuff Size: Adult Large)   Pulse 77   Temp 98  F (36.7  C)   Resp 20   Ht 1.753 m (5' 9\")   Wt 146.2 kg (322 lb 6.4 oz)   SpO2 95%   BMI 47.61 kg/m    Body mass index is 47.61 kg/m .  Physical Exam  Constitutional:       Comments: Markedly obese   Neck:      Musculoskeletal: Normal range of motion.   Musculoskeletal:         General: Tenderness present. No swelling or deformity.      Right lower leg: No edema.   Neurological:      Mental Status: He is alert.         Diagnostic Test Results:  none     ASSESSMENT/PLAN:       Knee shoulder pain 1. Class 3 severe obesity due to excess calories with serious comorbidity and body mass index (BMI) of 45.0 to 49.9 in adult (H)    - BARIATRIC ADULT REFERRAL    2. Primary osteoarthritis of both knees  We will start Lodine.  - etodolac (LODINE) 500 MG tablet; Take 1 tablet (500 mg) by mouth 2 times daily  Dispense: 60 tablet; Refill: 3    I had a long discussion with the patient concerning knee pain.  He is showing degenerative changes at 28 years old and told him this is certainly worrisome.  His main problem I believe is his obesity.  I highly encouraged that he seek medical attention and possibly pursue bariatric consultation.  Arrangements were made.  I also discussed his orthopedic referral but advised him that it be likely at this point they would want something done with his weight first.  And he is agreeable.  25 minutes spent with the patient greater than 50% counseling coordinating care discussing the need to lose weight    Suraj Weller MD  [facility] \[facility] [place] [facility]\""  "

## 2020-03-11 ENCOUNTER — HEALTH MAINTENANCE LETTER (OUTPATIENT)
Age: 30
End: 2020-03-11

## 2020-09-24 ENCOUNTER — OFFICE VISIT (OUTPATIENT)
Dept: FAMILY MEDICINE | Facility: OTHER | Age: 30
End: 2020-09-24
Attending: FAMILY MEDICINE
Payer: COMMERCIAL

## 2020-09-24 VITALS
DIASTOLIC BLOOD PRESSURE: 80 MMHG | OXYGEN SATURATION: 98 % | HEART RATE: 70 BPM | RESPIRATION RATE: 20 BRPM | TEMPERATURE: 96.5 F | BODY MASS INDEX: 45.1 KG/M2 | WEIGHT: 315 LBS | HEIGHT: 70 IN | SYSTOLIC BLOOD PRESSURE: 110 MMHG

## 2020-09-24 DIAGNOSIS — M22.2X2 PATELLOFEMORAL PAIN SYNDROME OF BOTH KNEES: Primary | ICD-10-CM

## 2020-09-24 DIAGNOSIS — E66.813 CLASS 3 SEVERE OBESITY DUE TO EXCESS CALORIES WITH SERIOUS COMORBIDITY AND BODY MASS INDEX (BMI) OF 45.0 TO 49.9 IN ADULT (H): ICD-10-CM

## 2020-09-24 DIAGNOSIS — M22.2X1 PATELLOFEMORAL PAIN SYNDROME OF BOTH KNEES: Primary | ICD-10-CM

## 2020-09-24 DIAGNOSIS — E66.01 CLASS 3 SEVERE OBESITY DUE TO EXCESS CALORIES WITH SERIOUS COMORBIDITY AND BODY MASS INDEX (BMI) OF 45.0 TO 49.9 IN ADULT (H): ICD-10-CM

## 2020-09-24 PROCEDURE — 99214 OFFICE O/P EST MOD 30 MIN: CPT | Performed by: FAMILY MEDICINE

## 2020-09-24 PROCEDURE — G0463 HOSPITAL OUTPT CLINIC VISIT: HCPCS

## 2020-09-24 RX ORDER — IBUPROFEN 800 MG/1
800 TABLET, FILM COATED ORAL EVERY 8 HOURS PRN
Qty: 90 TABLET | Refills: 11 | Status: SHIPPED | OUTPATIENT
Start: 2020-09-24

## 2020-09-24 RX ORDER — GABAPENTIN 300 MG/1
300 CAPSULE ORAL 3 TIMES DAILY
Qty: 60 CAPSULE | Refills: 11 | Status: SHIPPED | OUTPATIENT
Start: 2020-09-24 | End: 2024-06-29

## 2020-09-24 ASSESSMENT — PAIN SCALES - GENERAL: PAINLEVEL: EXTREME PAIN (8)

## 2020-09-24 ASSESSMENT — MIFFLIN-ST. JEOR: SCORE: 2427.5

## 2020-09-24 NOTE — PROGRESS NOTES
"Nursing Notes:   Rossy Weeks LPN  9/24/2020 10:49 AM  Signed  Chief Complaint   Patient presents with     [facility]   He is here today to establish care. He is having pain in both knee and both shoulders.  Rossy Weeks LPN..................9/24/2020   10:49 AM      Initial /80   Pulse 70   Temp 96.5  F (35.8  C) (Temporal)   Resp 20   Ht 1.765 m (5' 9.5\")   Wt 147.4 kg (325 lb)   SpO2 98%   BMI 47.31 kg/m   Estimated body mass index is 47.31 kg/m  as calculated from the following:    Height as of this encounter: 1.765 m (5' 9.5\").    Weight as of this encounter: 147.4 kg (325 lb).  Medication Reconciliation: complete    Rossy Weeks LPN    SUBJECTIVE:  Suraj Currie  is a 29 year old male who comes in to establish care again. I saw him 2 years ago.     He has bilateral knee pain. This has been going on for years. Two years ago we gave him this advice: \"Lengthy discussion with regard to his knees.  Feel he has significant malalignment and patellofemoral syndrome.  Discussed importance of good foot wear with a firm last, good arch support in good heel counter.  Needs to work on hamstring stretching and quadricep strengthening.  Referred to physical therapy for same.  If he does not have improvement in his symptoms, would then consider referral to orthopedics as he may be a candidate for lateral release or other corrective surgery. Lengthy discussion with regard to his weight and his risk for diabetes and hypertension given his family history and certainly this is causing significant difficulties with his already abnormal knees.  Discussed the diabetes prevention trial and the importance of a healthy diet and increased exercise.  If he is unsuccessful in doing this on his own, may need to consider referral for medical weight management.\" I saw him 2 months later and he had not really connected with PT and was having mechanical locking symptoms. We referred him to orthopedics and he " didn't show for his appointment. I saw him in May 2019 and we referred him to Dr. De La Vega for medical weight management, referred him to orthopedic surgery again and referred him to PT again.  He saw Dr. Weller in October and he said he was never contacted about the referrals. He was referred to the [facility] weight loss clinic and was contacted.     He has some pain that radiates up the anterior thigh. He has some relief from sitting or lying down.  He has more trouble with standing than walking.  It bothers going up and down stairs, going up bothers more.  He has rare locking symptoms still with his right knee.  He went to PT a couple of times, but no long time.  He did the pool therapy at [facility], then lost insurance.      Past Medical, Family, and Social History reviewed and updated as noted below.   ROS is negative except as noted above       No Known Allergies,   Family History   Problem Relation Age of Onset     Hypertension Mother      Hypertension Sister      Diabetes Sister      Lung Cancer Maternal Grandmother      Hypertension Maternal Grandfather      Heart Failure Maternal Grandfather      Diabetes Maternal Grandfather      Other - See Comments Sister         possible OD.   ,   Current Outpatient Medications   Medication     gabapentin (NEURONTIN) 300 MG capsule     ibuprofen (ADVIL/MOTRIN) 800 MG tablet     No current facility-administered medications for this visit.    , History reviewed. No pertinent past medical history.,   Patient Active Problem List    Diagnosis Date Noted     Primary osteoarthritis of both knees 10/17/2019     Priority: Medium     Tobacco abuse 07/26/2018     Priority: Medium     Patellofemoral pain syndrome of both knees 07/26/2018     Priority: Medium     Tension headache 07/26/2018     Priority: Medium     Class 3 severe obesity due to excess calories with serious comorbidity and body mass index (BMI) of 45.0 to 49.9 in adult (H) 07/26/2018     Priority: Medium      "Chronic pain of both knees 07/26/2018     Priority: Medium   ,   Past Surgical History:   Procedure Laterality Date     TONSILLECTOMY & ADENOIDECTOMY  age 11    and   Social History     Tobacco Use     Smoking status: Current Every Day Smoker     Packs/day: 0.50     Types: Cigarettes     Smokeless tobacco: Never Used   Substance Use Topics     Alcohol use: No     Comment: Alcoholic Drinks/day: occasional     OBJECTIVE:  /80   Pulse 70   Temp 96.5  F (35.8  C) (Temporal)   Resp 20   Ht 1.765 m (5' 9.5\")   Wt 147.4 kg (325 lb)   SpO2 98%   BMI 47.31 kg/m     EXAM:  Obese white male, no distress.  He has significant increase in Q angle of his lower extremities.  His patellae are high riding and laterally riding and are tender to palpation with some discomfort over the suprapatellar pouch on the right which likely is due to referred pain and is causing his symptoms as noted above.  He has no significant effusion.  He has tight hamstrings and weak quads as well as some overpronation.  Reviewed x-ray with him.  He has no joint line tenderness and Cary's and Lachman's and anterior drawer posterior drawer signs are equivocal.  ASSESSMENT/Plan :    Suraj was seen today for establish care.    Diagnoses and all orders for this visit:    Patellofemoral pain syndrome of both knees  -     Orthopedic & Spine  Referral; Future  -     gabapentin (NEURONTIN) 300 MG capsule; Take 1 capsule (300 mg) by mouth 3 times daily  -     ibuprofen (ADVIL/MOTRIN) 800 MG tablet; Take 1 tablet (800 mg) by mouth every 8 hours as needed for moderate pain  -     PHYSICAL THERAPY REFERRAL; Future  -     COMPREHENSIVE WEIGHT MANAGEMENT    Class 3 severe obesity due to excess calories with serious comorbidity and body mass index (BMI) of 45.0 to 49.9 in adult (H)  -     COMPREHENSIVE WEIGHT MANAGEMENT      Lengthy discussion again about patellofemoral pain.  He has some mechanical symptoms with occasional locking of his right " knee.  I think he needs an orthopedic referral for an opinion as he actually might be someone who would benefit from lateral release.  He really needs to follow through with physical therapy and be diligent about that.  Discussed the importance of weight loss and he understands that and we referred him back to Dr. De La Vega for this as well.    Discussed diet, exercise and healthy lifestyle changes.     A total of 25 minutes was spent with the patient, greater than 50% of the time was spent in counseling/discussion of the aforementioned concerns.     Michael Marquis MD

## 2020-12-27 ENCOUNTER — HEALTH MAINTENANCE LETTER (OUTPATIENT)
Age: 30
End: 2020-12-27

## 2021-04-25 ENCOUNTER — HEALTH MAINTENANCE LETTER (OUTPATIENT)
Age: 31
End: 2021-04-25

## 2021-10-09 ENCOUNTER — HEALTH MAINTENANCE LETTER (OUTPATIENT)
Age: 31
End: 2021-10-09

## 2022-04-27 ENCOUNTER — OFFICE VISIT (OUTPATIENT)
Dept: FAMILY MEDICINE | Facility: OTHER | Age: 32
End: 2022-04-27
Attending: FAMILY MEDICINE
Payer: COMMERCIAL

## 2022-04-27 VITALS
HEIGHT: 69 IN | BODY MASS INDEX: 46.36 KG/M2 | DIASTOLIC BLOOD PRESSURE: 72 MMHG | SYSTOLIC BLOOD PRESSURE: 132 MMHG | RESPIRATION RATE: 20 BRPM | OXYGEN SATURATION: 99 % | HEART RATE: 100 BPM | TEMPERATURE: 97.5 F | WEIGHT: 313 LBS

## 2022-04-27 DIAGNOSIS — K04.7 DENTAL INFECTION: Primary | ICD-10-CM

## 2022-04-27 PROCEDURE — 10060 I&D ABSCESS SIMPLE/SINGLE: CPT | Performed by: FAMILY MEDICINE

## 2022-04-27 PROCEDURE — 250N000009 HC RX 250: Performed by: FAMILY MEDICINE

## 2022-04-27 PROCEDURE — G0463 HOSPITAL OUTPT CLINIC VISIT: HCPCS | Mod: 25

## 2022-04-27 PROCEDURE — 99213 OFFICE O/P EST LOW 20 MIN: CPT | Mod: 25 | Performed by: FAMILY MEDICINE

## 2022-04-27 RX ORDER — LIDOCAINE HYDROCHLORIDE AND EPINEPHRINE 10; 10 MG/ML; UG/ML
1 INJECTION, SOLUTION INFILTRATION; PERINEURAL ONCE
Status: COMPLETED | OUTPATIENT
Start: 2022-04-27 | End: 2022-04-27

## 2022-04-27 RX ADMIN — LIDOCAINE HYDROCHLORIDE,EPINEPHRINE BITARTRATE 1 ML: 10; .01 INJECTION, SOLUTION INFILTRATION; PERINEURAL at 13:56

## 2022-04-27 ASSESSMENT — PAIN SCALES - GENERAL: PAINLEVEL: SEVERE PAIN (7)

## 2022-04-27 NOTE — NURSING NOTE
"Patient presents to the clinic for dental infection.    FOOD SECURITY SCREENING QUESTIONS:    The next two questions are to help us understand your food security.  If you are feeling you need any assistance in this area, we have resources available to support you today.    Hunger Vital Signs:  Within the past 12 months we worried whether our food would run out before we got money to buy more. Never  Within the past 12 months the food we bought just didn't last and we didn't have money to get more. Never    Advance Care Directive on file? no  Advance Care Directive provided to patient? Declined.    Chief Complaint   Patient presents with     Mouth Problem       Initial /72 (BP Location: Right arm, Patient Position: Sitting, Cuff Size: Adult Large)   Pulse 100   Temp 97.5  F (36.4  C) (Tympanic)   Resp 20   Ht 1.753 m (5' 9\")   Wt 142 kg (313 lb)   SpO2 99%   BMI 46.22 kg/m   Estimated body mass index is 46.22 kg/m  as calculated from the following:    Height as of this encounter: 1.753 m (5' 9\").    Weight as of this encounter: 142 kg (313 lb).  Medication Reconciliation: complete        Tashia Luis LPN       "

## 2022-04-27 NOTE — PROGRESS NOTES
"  Assessment & Plan       ICD-10-CM    1. Dental infection  K04.7 amoxicillin-clavulanate (AUGMENTIN) 875-125 MG tablet     lidocaine 1% with EPINEPHrine 1:100,000 injection 1 mL     Oral Surgery Referral     DRAIN SKIN ABSCESS SIMPLE/SINGLE     Is a dental infection with extension of swelling to the left cheek area.  No swelling by the eye.  We discussed potential treatment with IM antibiotics, but he is likely to improve with I&D and oral antibiotics.    PROCEDURE: Need for I&D discussed.  Risks and benefits reviewed.  Lidocaine 1% with injected, 0.5 mL total.  Scalpel used to incise over fluctuant area on maxillary gum surface at molars with return of a small amount of purulent drainage.      Placed on Augmentin twice daily for 10 days.  He should use salt water rinses to help clear the purulence.  Expect it may take a day to see some improvement in facial swelling, but thinks things should improve.  If he worsens, present to the ED.  May need facial CT scanning to look for deep seeded abscess.  Referral placed to oral surgery as he likely requires extraction of the adjacent tooth for presumed apical abscess       Luis Block MD   [facility]      Magda Ruelas is a 31 year old who presents for the following health issues     History of Present Illness       Reason for visit:  My face hurts  Symptom onset:  1-3 days ago    He eats 2-3 servings of fruits and vegetables daily.He consumes 1 sweetened beverage(s) daily.   He is taking medications regularly.     Left tooth infection  Purulent drainage last night  Face now swollen  Not on any antibiotics  Cannot get into a dentist      Review of Systems   No fever      Objective    /72 (BP Location: Right arm, Patient Position: Sitting, Cuff Size: Adult Large)   Pulse 100   Temp 97.5  F (36.4  C) (Tympanic)   Resp 20   Ht 1.753 m (5' 9\")   Wt 142 kg (313 lb)   SpO2 99%   BMI 46.22 kg/m    Body mass index is 46.22 " kg/m .  Physical Exam   General Appearance: Pleasant, alert, appropriate appearance for age. No acute distress  Head: Left cheek area is swollen compared to right.  Mild induration.  No erythema.  OroPharynx Exam: Poor dentition.  Broken left upper molar.  He has swelling of the left maxillary gumline.  Area adjacent to that is indurated.  Neck Exam: Supple, no masses or nodes.

## 2022-04-27 NOTE — LETTER
April 27, 2022      Suraj Currie  [address] [place] [phone]        To Whom It May Concern:    Suraj Currie was seen in our clinic. Please excuse him from work today.      Sincerely,        Luis Block MD

## 2022-04-27 NOTE — PATIENT INSTRUCTIONS
Lanced the area of infection  Continue with salt water rinses  Take Augmentin twice daily. Try get 2 pills in today  Referral to oral surgery - [facility] [phone]

## 2022-05-21 ENCOUNTER — HEALTH MAINTENANCE LETTER (OUTPATIENT)
Age: 32
End: 2022-05-21

## 2022-09-17 ENCOUNTER — HEALTH MAINTENANCE LETTER (OUTPATIENT)
Age: 32
End: 2022-09-17

## 2023-06-04 ENCOUNTER — HEALTH MAINTENANCE LETTER (OUTPATIENT)
Age: 33
End: 2023-06-04

## 2023-06-19 ENCOUNTER — TRANSFERRED RECORDS (OUTPATIENT)
Dept: HEALTH INFORMATION MANAGEMENT | Facility: OTHER | Age: 33
End: 2023-06-19
Payer: COMMERCIAL

## 2024-06-28 ENCOUNTER — OFFICE VISIT (OUTPATIENT)
Dept: FAMILY MEDICINE | Facility: OTHER | Age: 34
End: 2024-06-28
Attending: FAMILY MEDICINE
Payer: COMMERCIAL

## 2024-06-28 ENCOUNTER — HOSPITAL ENCOUNTER (OUTPATIENT)
Dept: GENERAL RADIOLOGY | Facility: OTHER | Age: 34
Discharge: HOME | End: 2024-06-28
Attending: FAMILY MEDICINE
Payer: COMMERCIAL

## 2024-06-28 VITALS
HEART RATE: 79 BPM | DIASTOLIC BLOOD PRESSURE: 78 MMHG | RESPIRATION RATE: 18 BRPM | BODY MASS INDEX: 46.65 KG/M2 | TEMPERATURE: 97.4 F | SYSTOLIC BLOOD PRESSURE: 116 MMHG | WEIGHT: 315 LBS | HEIGHT: 69 IN | OXYGEN SATURATION: 99 %

## 2024-06-28 DIAGNOSIS — M22.2X1 PATELLOFEMORAL PAIN SYNDROME OF BOTH KNEES: ICD-10-CM

## 2024-06-28 DIAGNOSIS — M17.0 PRIMARY OSTEOARTHRITIS OF BOTH KNEES: ICD-10-CM

## 2024-06-28 DIAGNOSIS — M25.561 CHRONIC PAIN OF BOTH KNEES: ICD-10-CM

## 2024-06-28 DIAGNOSIS — M22.2X2 PATELLOFEMORAL PAIN SYNDROME OF BOTH KNEES: ICD-10-CM

## 2024-06-28 DIAGNOSIS — M22.2X2 PATELLOFEMORAL PAIN SYNDROME OF BOTH KNEES: Primary | ICD-10-CM

## 2024-06-28 DIAGNOSIS — M62.838 LEG MUSCLE SPASM: ICD-10-CM

## 2024-06-28 DIAGNOSIS — M25.562 CHRONIC PAIN OF BOTH KNEES: ICD-10-CM

## 2024-06-28 DIAGNOSIS — M22.2X1 PATELLOFEMORAL PAIN SYNDROME OF BOTH KNEES: Primary | ICD-10-CM

## 2024-06-28 DIAGNOSIS — G89.29 CHRONIC PAIN OF BOTH KNEES: ICD-10-CM

## 2024-06-28 PROCEDURE — G0463 HOSPITAL OUTPT CLINIC VISIT: HCPCS

## 2024-06-28 PROCEDURE — 73564 X-RAY EXAM KNEE 4 OR MORE: CPT | Mod: 50

## 2024-06-28 PROCEDURE — 99214 OFFICE O/P EST MOD 30 MIN: CPT | Performed by: FAMILY MEDICINE

## 2024-06-28 RX ORDER — CYCLOBENZAPRINE HCL 5 MG
5-10 TABLET ORAL
Qty: 30 TABLET | Refills: 1 | Status: SHIPPED | OUTPATIENT
Start: 2024-06-28

## 2024-06-28 ASSESSMENT — PAIN SCALES - GENERAL: PAINLEVEL: EXTREME PAIN (8)

## 2024-06-28 NOTE — NURSING NOTE
Patient presents to clinic with bilateral knee pain. No known injury. He states that he is willing to try PT.  Savannah Leblanc LPN ....................  6/28/2024   11:05 AM  EXT [id number]

## 2024-06-28 NOTE — PROGRESS NOTES
"  Assessment & Plan     1. Patellofemoral pain syndrome of both knees  2. Chronic pain of both knees  3. Primary osteoarthritis of both knees  Chronic, worsening.   PT for PFS which he has not previously completed due to lapses in insurance and transportation issues.  Goal: quad strengthening to see if underlying arthritis is still problematic.  Follow up after 6+ weeks of PT.  - Physical Therapy  Referral; Future  - XR Knee Standing 2v Bilateral & 2v Bilateral; Future    4. Leg muscle spasm  Chronic, waxes and wanes.  Rx for flexeril prn at bedtime.  - cyclobenzaprine (FLEXERIL) 5 MG tablet; Take 1-2 tablets (5-10 mg) by mouth nightly as needed for muscle spasms  Dispense: 30 tablet; Refill: 1    MDM:  Prescription management  Diagnostic imaging ordered    Nicotine/Tobacco Cessation  He reports that he has been smoking cigarettes. He has never used smokeless tobacco.  Nicotine/Tobacco Cessation Plan  Not addressed in visit    Follow up: pending PT response.    Magda Ruelas is a 33 year old, presenting for the following health issues:  Knee Pain         No data to display              History of Present Illness       Reason for visit:  Knees    He eats 2-3 servings of fruits and vegetables daily.He consumes 1 sweetened beverage(s) daily.He exercises with enough effort to increase his heart rate 20 to 29 minutes per day.  He exercises with enough effort to increase his heart rate 4 days per week.   He is taking medications regularly.     Chronic R knee pain (somewhat of L, but much improved):  Worsening.  Falling asleep is hard 2/2 to pain.  Feels okay in AM.  Feels like he can get it to \"pop\" in joint line/above - usually is better day.    As the day goes on:  --when working; burning pain from knee to lateral hip.  --can feel like his knee can give out.    Some OTC analgesics.  Was recommended to previously try PT; but due to insurance lapses or transportation issues was unable to " "complete.      Years of knee pain previously.  Has seen his prior PCP re: this and reviewed note showing:  \"Lengthy discussion with regard to his knees.  Feel he has significant malalignment and patellofemoral syndrome.  Discussed importance of good foot wear with a firm last, good arch support in good heel counter.  Needs to work on hamstring stretching and quadricep strengthening.  Referred to physical therapy for same.  If he does not have improvement in his symptoms, would then consider referral to orthopedics as he may be a candidate for lateral release or other corrective surgery. Lengthy discussion with regard to his weight and his risk for diabetes and hypertension given his family history and certainly this is causing significant difficulties with his already abnormal knees.  Discussed the diabetes prevention trial and the importance of a healthy diet and increased exercise.  If he is unsuccessful in doing this on his own, may need to consider referral for medical weight management.\" I saw him 2 months later and he had not really connected with PT and was having mechanical locking symptoms. We referred him to orthopedics and he didn't show for his appointment. I saw him in May 2019 and we referred him to Dr. De La Vega for medical weight management, referred him to orthopedic surgery again and referred him to PT again.  He saw Dr. Weller in October and he said he was never contacted about the referrals. He was referred to the [facility] and was contacted.      He has some pain that radiates up the anterior thigh. He has some relief from sitting or lying down.  He has more trouble with standing than walking.  It bothers going up and down stairs, going up bothers more.  He has rare locking symptoms still with his right knee.  He went to PT a couple of times, but no long time.  He did the pool therapy at [facility], then lost insurance.\"        Objective    /78   Pulse 79   Temp 97.4 " " F (36.3  C)   Resp 18   Ht 1.753 m (5' 9\")   Wt 147.6 kg (325 lb 6.4 oz)   SpO2 99%   BMI 48.05 kg/m    Body mass index is 48.05 kg/m .  Physical Exam   GENERAL: alert and no distress  ORTHO: Knee Exam: Inspection: genu valgum, No effusion.   Tender: lateral joint line, popliteal  Non-tender: MCL, LCL, medial tibial plateau, lateral tibial plateau, medial femoral condyle, lateral femoral condyle, distal IT band, pes anserine bursa  Active Range of Motion: full flexion, full extension    Results for orders placed or performed during the hospital encounter of 06/28/24   XR Knee Standing 2v Bilateral & 2v Bilateral     Status: None    Narrative    Exam: XR KNEE STANDING 2 VW BILAT AND 2 VW BILAT    Technique: Bilateral knees, 4 views each side    Comparison: 7/26/2019    Exam reason: Patellofemoral pain syndrome of both knees;  Patellofemoral pain syndrome of both knees; Chronic pain of both  knees; Chronic pain of both knees; Chronic pain of both knees; Primary  osteoarthritis of both knees    Findings:  No acute fracture or dislocation. Moderate lateral compartment joint  space narrowing bilaterally. There are patellofemoral and lateral  compartment osteophytes bilaterally.    Soft tissues appear normal.      Impression    Impression:  No acute fracture or dislocation.    JESSICA SMITH MD         SYSTEM ID:  [id number]           Signed Electronically by: Janine Cosme DO    "

## 2024-07-13 ENCOUNTER — HEALTH MAINTENANCE LETTER (OUTPATIENT)
Age: 34
End: 2024-07-13

## 2024-12-09 ENCOUNTER — OFFICE VISIT (OUTPATIENT)
Dept: FAMILY MEDICINE | Facility: OTHER | Age: 34
End: 2024-12-09
Attending: STUDENT IN AN ORGANIZED HEALTH CARE EDUCATION/TRAINING PROGRAM
Payer: COMMERCIAL

## 2024-12-09 VITALS
SYSTOLIC BLOOD PRESSURE: 128 MMHG | RESPIRATION RATE: 18 BRPM | HEIGHT: 69 IN | BODY MASS INDEX: 46.65 KG/M2 | HEART RATE: 83 BPM | TEMPERATURE: 98 F | WEIGHT: 315 LBS | OXYGEN SATURATION: 98 % | DIASTOLIC BLOOD PRESSURE: 70 MMHG

## 2024-12-09 DIAGNOSIS — K04.7 DENTAL INFECTION: Primary | ICD-10-CM

## 2024-12-09 PROCEDURE — G0463 HOSPITAL OUTPT CLINIC VISIT: HCPCS

## 2024-12-09 PROCEDURE — 99213 OFFICE O/P EST LOW 20 MIN: CPT | Performed by: STUDENT IN AN ORGANIZED HEALTH CARE EDUCATION/TRAINING PROGRAM

## 2024-12-09 ASSESSMENT — PAIN SCALES - GENERAL: PAINLEVEL_OUTOF10: EXTREME PAIN (8)

## 2024-12-09 NOTE — PROGRESS NOTES
"  Assessment & Plan     (K04.7) Dental infection  (primary encounter diagnosis)    Comment: Dental infection.  Vital signs are stable.  Some swelling but no notable abscess palpated today.    Plan: amoxicillin-clavulanate (AUGMENTIN) 875-125 MG         tablet         Plan to treat with Augmentin twice a day for 10 days.  Continue ibuprofen and or Tylenol.  Follow-up with dentist.  He does have plan to follow-up with them.  Return to [facility] clinic or ER if symptoms worsen or change in the meantime.  He is comfortable and agreeable with this plan.      Magda Ruelas is a 33 year old, presenting for the following health issues:  Dental Problem (X2 days)    HPI     Patient presents today with concerns of right sided cheek swelling, pain. He notes it started a couple of days ago, has been getting worse. Notes swelling improved this morning with ice packs. He has been using ibuprofen as well. He notes some foul taste in mouth, but no notable drainage. He does note history of poor dentition, cracked teeth. No fevers.       Review of Systems  Constitutional, HEENT, cardiovascular, pulmonary, gi and gu systems are negative, except as otherwise noted.        Objective    /70   Pulse 83   Temp 98  F (36.7  C) (Tympanic)   Resp 18   Ht 1.753 m (5' 9\")   Wt (!) 157.9 kg (348 lb)   SpO2 98%   BMI 51.39 kg/m    Body mass index is 51.39 kg/m .    Physical Exam   GENERAL: alert and no distress  EYES: Eyes grossly normal to inspection, PERRL and conjunctivae and sclerae normal  HENT: slight edema of right cheek, no erythema, mild tenderness to palpation, upper right gumline with mild erythema, cracked/filled molars, no erythema, edema in the pharynx, symmetric  NECK: no adenopathy, no asymmetry, masses, or scars  RESP: lungs clear to auscultation - no rales, rhonchi or wheezes  CV: regular rate and rhythm, normal S1 S2  MS: no gross musculoskeletal defects noted, no edema        Signed Electronically by: Lucrecia" YENIFER Crump PA-C

## 2024-12-09 NOTE — NURSING NOTE
"Chief Complaint   Patient presents with    Dental Problem     X2 days     Patient here for possible mouth infection x2 days, notes upper right side. Has been icing.     Initial /70   Pulse 83   Temp 98  F (36.7  C) (Tympanic)   Resp 18   Ht 1.753 m (5' 9\")   Wt (!) 157.9 kg (348 lb)   SpO2 98%   BMI 51.39 kg/m   Estimated body mass index is 51.39 kg/m  as calculated from the following:    Height as of this encounter: 1.753 m (5' 9\").    Weight as of this encounter: 157.9 kg (348 lb).  Medication Review: complete    The next two questions are to help us understand your food security.  If you are feeling you need any assistance in this area, we have resources available to support you today.          12/9/2024   SDOH- Food Insecurity   Within the past 12 months, did you worry that your food would run out before you got money to buy more? N   Within the past 12 months, did the food you bought just not last and you didn t have money to get more? N            Health Care Directive:  Patient does not have a Health Care Directive: Discussed advance care planning with patient; however, patient declined at this time.    Dory Buchanan LPN      "

## 2024-12-09 NOTE — PATIENT INSTRUCTIONS
Dental Infection    Augmentin twice a day for 10 days.    Ibuprofen/tylenol.    Ice packs.    Follow up with dentist.  Return to rapid clinic/ER for worsening symptoms.

## 2025-02-15 ENCOUNTER — NURSE TRIAGE (OUTPATIENT)
Dept: NURSING | Facility: CLINIC | Age: 35
End: 2025-02-15

## 2025-02-16 NOTE — TELEPHONE ENCOUNTER
"Nurse Triage SBAR    Is this a 2nd Level Triage? NO    Situation:  Dental abscess     Background: Pt gave verbal consent to speak with his mother Suzi. Pt taking Augmentin twice daily since yesterday for dental abscess, three doses so far. TA temperature at time of call 97.4. Suzi reports pt \"in a lot of pain, mouth so swollen can't close it, right side face very swollen, almost whole cheek, pink\" worse than yesterday per Suzi. Pt taking \"ibuprofen took three at 6 pm\" today. Pt rates pain \"10\". Pt able to swallow fluids.     Assessment:  Dental abscess with acutely worsening symptoms on antibiotics > 24 hours.     Protocol Recommended Disposition:   Go to ED Now (Or PCP Triage)    Recommendation: ED visit now per protocol.     Does the patient meet one of the following criteria for ADS visit consideration? 16+ years old, with an [facility] PCP     TIP  Providers, please consider if this condition is appropriate for management at one of our Acute and Diagnostic Services sites.     If patient is a good candidate, please use dotphrase <dot>triageresponse and select Refer to ADS to document.    Reason for Disposition   Patient sounds very sick or weak to the triager    Additional Information   Negative: MODERATE difficulty breathing (e.g., speaks in phrases, SOB even at rest, pulse 100-120)   Negative: Fever > 103 F (39.4 C)    Protocols used: Infection on Antibiotic Follow-up Call-A-[facility]    "

## 2025-07-19 ENCOUNTER — HEALTH MAINTENANCE LETTER (OUTPATIENT)
Age: 35
End: 2025-07-19

## (undated) RX ORDER — LIDOCAINE HYDROCHLORIDE AND EPINEPHRINE 10; 10 MG/ML; UG/ML
INJECTION, SOLUTION INFILTRATION; PERINEURAL
Status: DISPENSED
Start: 2022-04-27